# Patient Record
Sex: FEMALE | Race: WHITE
[De-identification: names, ages, dates, MRNs, and addresses within clinical notes are randomized per-mention and may not be internally consistent; named-entity substitution may affect disease eponyms.]

---

## 2017-02-27 ENCOUNTER — HOSPITAL ENCOUNTER (OUTPATIENT)
Dept: HOSPITAL 19 - MC.RAD | Age: 72
End: 2017-02-27
Attending: INTERNAL MEDICINE
Payer: COMMERCIAL

## 2017-02-27 DIAGNOSIS — Z12.31: Primary | ICD-10-CM

## 2017-10-24 ENCOUNTER — HOSPITAL ENCOUNTER (OUTPATIENT)
Dept: HOSPITAL 19 - EUO | Age: 72
Discharge: HOME | End: 2017-10-24
Attending: INTERNAL MEDICINE
Payer: COMMERCIAL

## 2017-10-24 VITALS — WEIGHT: 187.83 LBS | HEIGHT: 60 IN | BODY MASS INDEX: 36.88 KG/M2

## 2017-10-24 VITALS — HEART RATE: 61 BPM | DIASTOLIC BLOOD PRESSURE: 63 MMHG | TEMPERATURE: 97.7 F | SYSTOLIC BLOOD PRESSURE: 147 MMHG

## 2017-10-24 DIAGNOSIS — M81.0: Primary | ICD-10-CM

## 2018-02-28 ENCOUNTER — HOSPITAL ENCOUNTER (OUTPATIENT)
Dept: HOSPITAL 19 - MC.RAD | Age: 73
End: 2018-02-28
Attending: INTERNAL MEDICINE
Payer: COMMERCIAL

## 2018-02-28 DIAGNOSIS — Z12.31: Primary | ICD-10-CM

## 2018-09-12 ENCOUNTER — HOSPITAL ENCOUNTER (OUTPATIENT)
Dept: HOSPITAL 19 - EUO | Age: 73
Discharge: HOME | End: 2018-09-12
Attending: INTERNAL MEDICINE
Payer: COMMERCIAL

## 2018-09-12 VITALS — HEIGHT: 60 IN | BODY MASS INDEX: 36.83 KG/M2 | WEIGHT: 187.61 LBS

## 2018-09-12 VITALS — HEART RATE: 64 BPM | DIASTOLIC BLOOD PRESSURE: 52 MMHG | TEMPERATURE: 96.9 F | SYSTOLIC BLOOD PRESSURE: 150 MMHG

## 2018-09-12 DIAGNOSIS — M81.0: Primary | ICD-10-CM

## 2018-09-12 DIAGNOSIS — Z79.899: ICD-10-CM

## 2019-03-07 ENCOUNTER — HOSPITAL ENCOUNTER (OUTPATIENT)
Dept: HOSPITAL 19 - MC.RAD | Age: 74
End: 2019-03-07
Attending: INTERNAL MEDICINE
Payer: MEDICARE

## 2019-03-07 DIAGNOSIS — Z12.31: Primary | ICD-10-CM

## 2019-10-21 ENCOUNTER — HOSPITAL ENCOUNTER (EMERGENCY)
Dept: HOSPITAL 19 - COL.ER | Age: 74
Discharge: HOME | End: 2019-10-21
Payer: MEDICARE

## 2019-10-21 VITALS — SYSTOLIC BLOOD PRESSURE: 115 MMHG | DIASTOLIC BLOOD PRESSURE: 56 MMHG

## 2019-10-21 VITALS — HEART RATE: 80 BPM

## 2019-10-21 VITALS — TEMPERATURE: 97.7 F

## 2019-10-21 VITALS — BODY MASS INDEX: 33.76 KG/M2 | WEIGHT: 171.96 LBS | HEIGHT: 60 IN

## 2019-10-21 DIAGNOSIS — Z95.9: ICD-10-CM

## 2019-10-21 DIAGNOSIS — E78.00: ICD-10-CM

## 2019-10-21 DIAGNOSIS — Z79.01: ICD-10-CM

## 2019-10-21 DIAGNOSIS — Z95.1: ICD-10-CM

## 2019-10-21 DIAGNOSIS — Z79.84: ICD-10-CM

## 2019-10-21 DIAGNOSIS — I25.10: ICD-10-CM

## 2019-10-21 DIAGNOSIS — E11.9: ICD-10-CM

## 2019-10-21 DIAGNOSIS — N17.9: Primary | ICD-10-CM

## 2019-10-21 DIAGNOSIS — Z79.82: ICD-10-CM

## 2019-10-21 DIAGNOSIS — I10: ICD-10-CM

## 2019-10-21 LAB
ALBUMIN SERPL-MCNC: 4.1 GM/DL (ref 3.5–5)
ALP SERPL-CCNC: 79 U/L (ref 50–136)
ALT SERPL-CCNC: 12 U/L (ref 9–52)
ANION GAP SERPL CALC-SCNC: 13 MMOL/L (ref 7–16)
AST SERPL-CCNC: 29 U/L (ref 15–37)
BASOPHILS # BLD: 0.1 10*3/UL (ref 0–0.2)
BASOPHILS NFR BLD AUTO: 1 % (ref 0–2)
BILIRUB SERPL-MCNC: 0.5 MG/DL (ref 0–1)
BUN SERPL-MCNC: 36 MG/DL (ref 7–17)
CALCIUM SERPL-MCNC: 10 MG/DL (ref 8.4–10.2)
CHLORIDE SERPL-SCNC: 102 MMOL/L (ref 98–107)
CO2 SERPL-SCNC: 24 MMOL/L (ref 22–30)
CREAT SERPL-SCNC: 1.35 UMOL/L (ref 0.52–1.25)
CRP SERPL-MCNC: 0.5 MG/DL (ref 0–0.9)
EOSINOPHIL # BLD: 0.1 10*3/UL (ref 0–0.7)
EOSINOPHIL NFR BLD: 1.8 % (ref 0–4)
ERYTHROCYTE [DISTWIDTH] IN BLOOD BY AUTOMATED COUNT: 14 % (ref 11.5–14.5)
GLUCOSE SERPL-MCNC: 98 MG/DL (ref 74–106)
GLUCOSE UR QL STRIP.AUTO: (no result)
GRANULOCYTES # BLD AUTO: 53.9 % (ref 42.2–75.2)
HCT VFR BLD AUTO: 36.6 % (ref 37–47)
HGB BLD-MCNC: 11.9 G/DL (ref 12.5–16)
INR BLD: 2.8 (ref 0.8–3)
LYMPHOCYTES # BLD: 2.4 10*3/UL (ref 1.2–3.4)
LYMPHOCYTES NFR BLD: 32.4 % (ref 20–51)
MCH RBC QN AUTO: 30 PG (ref 27–31)
MCHC RBC AUTO-ENTMCNC: 33 G/DL (ref 33–37)
MCV RBC AUTO: 92 FL (ref 80–100)
MONOCYTES # BLD: 0.8 10*3/UL (ref 0.1–0.6)
MONOCYTES NFR BLD AUTO: 10.5 % (ref 1.7–9.3)
NEUTROPHILS # BLD: 4 10*3/UL (ref 1.4–6.5)
PH UR STRIP.AUTO: 5 [PH] (ref 5–8)
PLATELET # BLD AUTO: 285 K/MM3 (ref 130–400)
PMV BLD AUTO: 10 FL (ref 7.4–10.4)
POTASSIUM SERPL-SCNC: 3.8 MMOL/L (ref 3.4–5)
PROT SERPL-MCNC: 7.2 GM/DL (ref 6.4–8.2)
PROTHROMBIN TIME: 34 SECONDS (ref 9.7–12.8)
RBC # BLD AUTO: 3.96 M/MM3 (ref 4.1–5.3)
RBC # UR STRIP.AUTO: (no result) /UL
RBC # UR: (no result) /HPF
SODIUM SERPL-SCNC: 139 MMOL/L (ref 137–145)
SP GR UR STRIP.AUTO: 1.01 (ref 1–1.03)
SQUAMOUS # URNS: (no result) /HPF
TROPONIN I SERPL-MCNC: < 0.012 NG/ML (ref 0–0.04)
URINE LEUKOCYTE ESTERASE: (no result)
URN COLLECT METHOD CLASS: (no result)

## 2019-11-08 ENCOUNTER — HOSPITAL ENCOUNTER (OUTPATIENT)
Dept: HOSPITAL 19 - EUO | Age: 74
Discharge: HOME | End: 2019-11-08
Attending: INTERNAL MEDICINE
Payer: MEDICARE

## 2019-11-08 VITALS — DIASTOLIC BLOOD PRESSURE: 67 MMHG | HEART RATE: 75 BPM | SYSTOLIC BLOOD PRESSURE: 121 MMHG | TEMPERATURE: 98.1 F

## 2019-11-08 VITALS — HEIGHT: 60 IN | WEIGHT: 167.55 LBS | BODY MASS INDEX: 32.89 KG/M2

## 2019-11-08 DIAGNOSIS — M81.0: Primary | ICD-10-CM

## 2020-01-03 ENCOUNTER — HOSPITAL ENCOUNTER (OUTPATIENT)
Dept: HOSPITAL 19 - COL.CR | Age: 75
LOS: 4 days | Discharge: HOME | End: 2020-01-07
Attending: OBSTETRICS & GYNECOLOGY
Payer: MEDICARE

## 2020-01-03 DIAGNOSIS — Z48.812: Primary | ICD-10-CM

## 2020-01-03 DIAGNOSIS — Z95.1: ICD-10-CM

## 2020-01-22 ENCOUNTER — HOSPITAL ENCOUNTER (OUTPATIENT)
Dept: HOSPITAL 19 - COL.CR | Age: 75
LOS: 7 days | Discharge: HOME | End: 2020-01-29
Attending: OBSTETRICS & GYNECOLOGY
Payer: MEDICARE

## 2020-01-22 DIAGNOSIS — Z48.812: Primary | ICD-10-CM

## 2020-01-22 DIAGNOSIS — Z95.1: ICD-10-CM

## 2020-05-05 ENCOUNTER — HOSPITAL ENCOUNTER (OUTPATIENT)
Dept: HOSPITAL 19 - COL.CAR | Age: 75
Discharge: HOME | End: 2020-05-05
Attending: INTERNAL MEDICINE
Payer: MEDICARE

## 2020-05-05 VITALS — SYSTOLIC BLOOD PRESSURE: 158 MMHG | DIASTOLIC BLOOD PRESSURE: 73 MMHG | HEART RATE: 78 BPM | TEMPERATURE: 98.3 F

## 2020-05-05 DIAGNOSIS — I47.1: ICD-10-CM

## 2020-05-05 DIAGNOSIS — I48.0: Primary | ICD-10-CM

## 2020-09-08 ENCOUNTER — HOSPITAL ENCOUNTER (OUTPATIENT)
Dept: HOSPITAL 19 - SDCO | Age: 75
LOS: 1 days | Discharge: HOME | End: 2020-09-09
Attending: ORTHOPAEDIC SURGERY
Payer: MEDICARE

## 2020-09-08 VITALS — DIASTOLIC BLOOD PRESSURE: 49 MMHG | HEART RATE: 62 BPM | SYSTOLIC BLOOD PRESSURE: 113 MMHG | TEMPERATURE: 98 F

## 2020-09-08 VITALS — HEART RATE: 64 BPM | DIASTOLIC BLOOD PRESSURE: 49 MMHG | TEMPERATURE: 98 F | SYSTOLIC BLOOD PRESSURE: 113 MMHG

## 2020-09-08 VITALS — DIASTOLIC BLOOD PRESSURE: 58 MMHG | HEART RATE: 60 BPM | TEMPERATURE: 98 F | SYSTOLIC BLOOD PRESSURE: 116 MMHG

## 2020-09-08 VITALS — DIASTOLIC BLOOD PRESSURE: 65 MMHG | TEMPERATURE: 98 F | HEART RATE: 62 BPM | SYSTOLIC BLOOD PRESSURE: 132 MMHG

## 2020-09-08 VITALS — HEART RATE: 57 BPM | TEMPERATURE: 98 F | SYSTOLIC BLOOD PRESSURE: 123 MMHG | DIASTOLIC BLOOD PRESSURE: 77 MMHG

## 2020-09-08 VITALS — SYSTOLIC BLOOD PRESSURE: 153 MMHG | TEMPERATURE: 98.5 F | DIASTOLIC BLOOD PRESSURE: 59 MMHG | HEART RATE: 76 BPM

## 2020-09-08 VITALS — BODY MASS INDEX: 35.06 KG/M2 | WEIGHT: 178.57 LBS | HEIGHT: 60 IN

## 2020-09-08 VITALS — HEART RATE: 63 BPM | DIASTOLIC BLOOD PRESSURE: 40 MMHG | SYSTOLIC BLOOD PRESSURE: 118 MMHG | TEMPERATURE: 98 F

## 2020-09-08 VITALS — HEART RATE: 64 BPM | SYSTOLIC BLOOD PRESSURE: 118 MMHG | DIASTOLIC BLOOD PRESSURE: 40 MMHG | TEMPERATURE: 98 F

## 2020-09-08 VITALS — DIASTOLIC BLOOD PRESSURE: 54 MMHG | SYSTOLIC BLOOD PRESSURE: 118 MMHG | HEART RATE: 77 BPM | TEMPERATURE: 99.3 F

## 2020-09-08 VITALS — HEART RATE: 60 BPM | TEMPERATURE: 98 F | DIASTOLIC BLOOD PRESSURE: 47 MMHG | SYSTOLIC BLOOD PRESSURE: 123 MMHG

## 2020-09-08 VITALS — SYSTOLIC BLOOD PRESSURE: 123 MMHG | DIASTOLIC BLOOD PRESSURE: 67 MMHG | TEMPERATURE: 98 F | HEART RATE: 61 BPM

## 2020-09-08 VITALS — TEMPERATURE: 98 F | SYSTOLIC BLOOD PRESSURE: 123 MMHG | DIASTOLIC BLOOD PRESSURE: 67 MMHG | HEART RATE: 61 BPM

## 2020-09-08 DIAGNOSIS — G47.33: ICD-10-CM

## 2020-09-08 DIAGNOSIS — Z95.1: ICD-10-CM

## 2020-09-08 DIAGNOSIS — I11.9: ICD-10-CM

## 2020-09-08 DIAGNOSIS — Z79.82: ICD-10-CM

## 2020-09-08 DIAGNOSIS — Z79.84: ICD-10-CM

## 2020-09-08 DIAGNOSIS — Z98.51: ICD-10-CM

## 2020-09-08 DIAGNOSIS — Z79.01: ICD-10-CM

## 2020-09-08 DIAGNOSIS — I35.0: ICD-10-CM

## 2020-09-08 DIAGNOSIS — Z79.899: ICD-10-CM

## 2020-09-08 DIAGNOSIS — I47.1: ICD-10-CM

## 2020-09-08 DIAGNOSIS — I48.91: ICD-10-CM

## 2020-09-08 DIAGNOSIS — M17.12: Primary | ICD-10-CM

## 2020-09-08 DIAGNOSIS — E11.9: ICD-10-CM

## 2020-09-08 DIAGNOSIS — Z85.828: ICD-10-CM

## 2020-09-08 DIAGNOSIS — E78.5: ICD-10-CM

## 2020-09-08 DIAGNOSIS — Z20.828: ICD-10-CM

## 2020-09-08 DIAGNOSIS — Z95.818: ICD-10-CM

## 2020-09-08 LAB
INR BLD: 1 (ref 0.8–3)
PROTHROMBIN TIME: 10.9 SECONDS (ref 9.7–12.8)

## 2020-09-08 PROCEDURE — C1776 JOINT DEVICE (IMPLANTABLE): HCPCS

## 2020-09-08 PROCEDURE — A9284 NON-ELECTRONIC SPIROMETER: HCPCS

## 2020-09-08 NOTE — NUR
Did receive Norco 2 tabs about an hour ago-- now up in pena for short walk
with walker and assistance- tolerated well.

## 2020-09-08 NOTE — NUR
PT TO ROOM 328 PER  BED WITH REPORT FROM [facility] @ 0988.  LUNGS CTA,
BOWEL SOUNDS HYPO,  DRESSING TO LEFT KNEE CDI WITH ACE WRAP OVER GAUZE. SCDS
AND TEDS APPLIED AS ORDERED. IV TO PUMP.

## 2020-09-08 NOTE — NUR
PT UP TO BR WITH SBAX1. HAD LG BM AND VOIDED. RETURNED TO RECLINER. IV FLUIDS
COMPLETE. PT EATING AND DRINKING WITH NO N/V. Operative leg elevated on pillow
and ice bag applied.

## 2020-09-08 NOTE — NUR
SW met with the patient to discuss discharge plan. The patient lives in
[place] with her , Steven (ph#[phone]). She reports
independence with ADLs and has a cane and FWW. The patient's PCP is Dr. Tony Le and she receives her medications at [facility]. She reports no
difficulties obtaining her meds. The patient does not have advanced directives
and she was not interested in completing a DPOA-HC at this time. The patient
plans to return home with her  and receive outpatient PT at [facility] upon discharge. No additional needs at this time.

## 2020-09-08 NOTE — NUR
Initial shift assessment done- states would like 2 Norco before bed tonight-
states left knee pain 5/10-- ace wrap dry, ice to knee, SCD,s on bilaterally.
Taking food/fluids without nausea. Using IS  every hour while awake. Up to
bathroom with walker and assist-tolerated well, voiding clear yellow urine.

## 2020-09-09 VITALS — TEMPERATURE: 98.3 F | DIASTOLIC BLOOD PRESSURE: 56 MMHG | HEART RATE: 72 BPM | SYSTOLIC BLOOD PRESSURE: 131 MMHG

## 2020-09-09 VITALS — SYSTOLIC BLOOD PRESSURE: 166 MMHG | HEART RATE: 65 BPM | TEMPERATURE: 98.2 F | DIASTOLIC BLOOD PRESSURE: 54 MMHG

## 2020-09-09 VITALS — DIASTOLIC BLOOD PRESSURE: 65 MMHG | HEART RATE: 72 BPM | SYSTOLIC BLOOD PRESSURE: 150 MMHG | TEMPERATURE: 97.8 F

## 2020-09-09 VITALS — DIASTOLIC BLOOD PRESSURE: 59 MMHG | TEMPERATURE: 98.3 F | HEART RATE: 71 BPM | SYSTOLIC BLOOD PRESSURE: 131 MMHG

## 2020-09-09 LAB
HCT VFR BLD AUTO: 37.2 % (ref 37–47)
HGB BLD-MCNC: 12.2 G/DL (ref 12.5–16)

## 2020-09-09 NOTE — NUR
DISCHARGE INSTRUCTIONS GIVEN TO PT. QUESTIONS ANSWERED. PRESCRIPTIONS GIVEN.
PT TO FRONT IN WHEEL CHAIR.

## 2020-09-09 NOTE — NUR
Sleeping well tonight, has not requested pain meds since before bed- new ice
to left knee , VSS, has been up to void 3-4 times last night

## 2021-03-22 ENCOUNTER — HOSPITAL ENCOUNTER (OUTPATIENT)
Dept: HOSPITAL 19 - COL.RAD | Age: 76
End: 2021-03-22
Attending: INTERNAL MEDICINE
Payer: MEDICARE

## 2021-03-22 DIAGNOSIS — Z20.822: Primary | ICD-10-CM

## 2021-04-08 ENCOUNTER — HOSPITAL ENCOUNTER (OUTPATIENT)
Dept: HOSPITAL 19 - COL.CAR | Age: 76
Discharge: HOME | End: 2021-04-08
Attending: INTERNAL MEDICINE
Payer: MEDICARE

## 2021-04-08 VITALS — BODY MASS INDEX: 33.85 KG/M2 | WEIGHT: 172.4 LBS | HEIGHT: 60 IN

## 2021-04-08 VITALS — TEMPERATURE: 98.7 F | DIASTOLIC BLOOD PRESSURE: 65 MMHG | SYSTOLIC BLOOD PRESSURE: 133 MMHG | HEART RATE: 49 BPM

## 2021-04-08 DIAGNOSIS — M19.90: ICD-10-CM

## 2021-04-08 DIAGNOSIS — I25.10: ICD-10-CM

## 2021-04-08 DIAGNOSIS — K21.9: ICD-10-CM

## 2021-04-08 DIAGNOSIS — Z53.8: ICD-10-CM

## 2021-04-08 DIAGNOSIS — Z96.652: ICD-10-CM

## 2021-04-08 DIAGNOSIS — E78.5: ICD-10-CM

## 2021-04-08 DIAGNOSIS — Z88.8: ICD-10-CM

## 2021-04-08 DIAGNOSIS — Z79.01: ICD-10-CM

## 2021-04-08 DIAGNOSIS — E11.9: ICD-10-CM

## 2021-04-08 DIAGNOSIS — Z79.82: ICD-10-CM

## 2021-04-08 DIAGNOSIS — I48.0: Primary | ICD-10-CM

## 2021-04-08 DIAGNOSIS — G47.33: ICD-10-CM

## 2021-04-08 DIAGNOSIS — I10: ICD-10-CM

## 2021-04-08 NOTE — NUR
Dr. Grigsby has been in to see pt and discuss poc.  Cardioversion cancelled. Pt
is noted to have a rhythm change at time of departure, rate 80's-90's,
irregular.  Dr. Grigsby informed through [facility].  Due to paroxysmal nature of
the AFIB, plan remains to dc patient with instructions to take her amiodarone
200 mg BID and follow up with cardiology.  Pt verbalized understanding of
instructions.  No iv was initiated during pt's visit.  I walked with pt to
exit with her .

## 2022-04-07 ENCOUNTER — HOSPITAL ENCOUNTER (INPATIENT)
Dept: HOSPITAL 19 - MEDICAL | Age: 77
LOS: 8 days | Discharge: HOME | DRG: 310 | End: 2022-04-15
Attending: INTERNAL MEDICINE | Admitting: INTERNAL MEDICINE
Payer: MEDICARE

## 2022-04-07 VITALS — WEIGHT: 177.25 LBS | BODY MASS INDEX: 34.8 KG/M2 | HEIGHT: 60 IN

## 2022-04-07 DIAGNOSIS — Z23: ICD-10-CM

## 2022-04-07 DIAGNOSIS — Z95.1: ICD-10-CM

## 2022-04-07 DIAGNOSIS — I10: ICD-10-CM

## 2022-04-07 DIAGNOSIS — I48.0: Primary | ICD-10-CM

## 2022-04-07 DIAGNOSIS — I25.10: ICD-10-CM

## 2022-04-07 DIAGNOSIS — I44.7: ICD-10-CM

## 2022-04-07 DIAGNOSIS — E78.5: ICD-10-CM

## 2022-04-07 DIAGNOSIS — Z79.01: ICD-10-CM

## 2022-04-07 PROCEDURE — A9270 NON-COVERED ITEM OR SERVICE: HCPCS

## 2022-04-13 VITALS — TEMPERATURE: 98.1 F | SYSTOLIC BLOOD PRESSURE: 96 MMHG | HEART RATE: 143 BPM | DIASTOLIC BLOOD PRESSURE: 84 MMHG

## 2022-04-13 VITALS — DIASTOLIC BLOOD PRESSURE: 93 MMHG | TEMPERATURE: 98.1 F | HEART RATE: 80 BPM | SYSTOLIC BLOOD PRESSURE: 110 MMHG

## 2022-04-13 VITALS — HEART RATE: 117 BPM | DIASTOLIC BLOOD PRESSURE: 75 MMHG | SYSTOLIC BLOOD PRESSURE: 114 MMHG | TEMPERATURE: 98 F

## 2022-04-13 VITALS — DIASTOLIC BLOOD PRESSURE: 71 MMHG | SYSTOLIC BLOOD PRESSURE: 107 MMHG | HEART RATE: 68 BPM | TEMPERATURE: 98.5 F

## 2022-04-13 LAB
ALBUMIN SERPL-MCNC: 4 GM/DL (ref 3.4–4.8)
ALP SERPL-CCNC: 45 U/L (ref 40–150)
ALT SERPL-CCNC: 22 U/L (ref 0–55)
ANION GAP SERPL CALC-SCNC: 14 MMOL/L (ref 7–16)
AST SERPL-CCNC: 16 U/L (ref 5–34)
BASOPHILS # BLD: 0 K/MM3 (ref 0–0.2)
BASOPHILS NFR BLD AUTO: 0.8 % (ref 0–2)
BILIRUB SERPL-MCNC: 0.8 MG/DL (ref 0.2–1.2)
BUN SERPL-MCNC: 29 MG/DL (ref 10–20)
CALCIUM SERPL-MCNC: 9.8 MG/DL (ref 8.4–10.2)
CHLORIDE SERPL-SCNC: 108 MMOL/L (ref 98–107)
CO2 SERPL-SCNC: 22 MMOL/L (ref 23–31)
CREAT SERPL-SCNC: 1.1 MG/DL (ref 0.57–1.11)
EOSINOPHIL # BLD: 0.1 K/MM3 (ref 0–0.7)
EOSINOPHIL NFR BLD: 1.3 % (ref 0–4)
ERYTHROCYTE [DISTWIDTH] IN BLOOD BY AUTOMATED COUNT: 14.2 % (ref 11.5–14.5)
GLUCOSE SERPL-MCNC: 108 MG/DL (ref 70–99)
GRANULOCYTES # BLD AUTO: 56.9 % (ref 42.2–75.2)
HCT VFR BLD AUTO: 41.6 % (ref 37–47)
HGB BLD-MCNC: 14 G/DL (ref 12.5–16)
INR BLD: 2.8 (ref 0.8–3)
LYMPHOCYTES # BLD: 1.3 K/MM3 (ref 1.2–3.4)
LYMPHOCYTES NFR BLD: 26.3 % (ref 20–51)
MAGNESIUM SERPL-MCNC: 1.5 MG/DL (ref 1.6–2.6)
MCH RBC QN AUTO: 30 PG (ref 27–31)
MCHC RBC AUTO-ENTMCNC: 34 G/DL (ref 33–37)
MCV RBC AUTO: 89 FL (ref 80–100)
MONOCYTES # BLD: 0.7 K/MM3 (ref 0.1–0.6)
MONOCYTES NFR BLD AUTO: 14.5 % (ref 1.7–9.3)
NEUTROPHILS # BLD: 2.7 K/MM3 (ref 1.4–6.5)
PLATELET # BLD AUTO: 218 K/MM3 (ref 130–400)
PMV BLD AUTO: 9.8 FL (ref 7.4–10.4)
POTASSIUM SERPL-SCNC: 3.9 MMOL/L (ref 3.5–4.5)
PROT SERPL-MCNC: 6.8 GM/DL (ref 6.2–8.1)
PROTHROMBIN TIME: 30.9 SECONDS (ref 9.7–12.8)
RBC # BLD AUTO: 4.7 M/MM3 (ref 4.1–5.3)
SODIUM SERPL-SCNC: 144 MMOL/L (ref 136–145)

## 2022-04-13 NOTE — NUR
PATIENT IS A&O. NOTED ELEVATED HR IN 'S ON TELE, IRREGULAR. PATIENT IS
SOTOLOL DAY 1. ALL OTHER VSS. DENIES CHEST PAIN, SOA OR DIZZINESS. INDEPENDENT
IN ROOM. NO C/O N/V. LEFT FORARM IV TO INT. HS MEDS GIVEN. HEAD TO TOE
ASSESSMENT COMPLETE. NO OTHER NEEDS AT THIS TIME. CALL LIGHT IN REACH.

## 2022-04-13 NOTE — NUR
Pt tolerated first dose of Sotalol without issues. No chest pain, dizziness or
palpitations. IV started in Express to LFA. POC discussed with patient. No
needs at this time.

## 2022-04-14 VITALS — DIASTOLIC BLOOD PRESSURE: 56 MMHG | HEART RATE: 51 BPM | TEMPERATURE: 98 F | SYSTOLIC BLOOD PRESSURE: 106 MMHG

## 2022-04-14 VITALS — HEART RATE: 87 BPM | SYSTOLIC BLOOD PRESSURE: 115 MMHG | DIASTOLIC BLOOD PRESSURE: 88 MMHG | TEMPERATURE: 97.9 F

## 2022-04-14 VITALS — SYSTOLIC BLOOD PRESSURE: 97 MMHG | HEART RATE: 57 BPM | TEMPERATURE: 98 F | DIASTOLIC BLOOD PRESSURE: 54 MMHG

## 2022-04-14 VITALS — TEMPERATURE: 97.7 F | SYSTOLIC BLOOD PRESSURE: 117 MMHG | DIASTOLIC BLOOD PRESSURE: 64 MMHG | HEART RATE: 88 BPM

## 2022-04-14 VITALS — DIASTOLIC BLOOD PRESSURE: 54 MMHG | TEMPERATURE: 98 F | HEART RATE: 64 BPM | SYSTOLIC BLOOD PRESSURE: 140 MMHG

## 2022-04-14 VITALS — DIASTOLIC BLOOD PRESSURE: 60 MMHG | SYSTOLIC BLOOD PRESSURE: 153 MMHG | TEMPERATURE: 97.6 F | HEART RATE: 62 BPM

## 2022-04-14 VITALS — TEMPERATURE: 98.3 F | DIASTOLIC BLOOD PRESSURE: 56 MMHG | HEART RATE: 59 BPM | SYSTOLIC BLOOD PRESSURE: 131 MMHG

## 2022-04-14 LAB
ANION GAP SERPL CALC-SCNC: 12 MMOL/L (ref 7–16)
BASOPHILS # BLD: 0.1 K/MM3 (ref 0–0.2)
BASOPHILS NFR BLD AUTO: 1.2 % (ref 0–2)
BUN SERPL-MCNC: 27 MG/DL (ref 10–20)
CALCIUM SERPL-MCNC: 9.1 MG/DL (ref 8.4–10.2)
CHLORIDE SERPL-SCNC: 106 MMOL/L (ref 98–107)
CO2 SERPL-SCNC: 22 MMOL/L (ref 23–31)
CREAT SERPL-SCNC: 0.9 MG/DL (ref 0.57–1.11)
EOSINOPHIL # BLD: 0.1 K/MM3 (ref 0–0.7)
EOSINOPHIL NFR BLD: 2.6 % (ref 0–4)
ERYTHROCYTE [DISTWIDTH] IN BLOOD BY AUTOMATED COUNT: 13.8 % (ref 11.5–14.5)
GLUCOSE SERPL-MCNC: 100 MG/DL (ref 70–99)
GRANULOCYTES # BLD AUTO: 46.7 % (ref 42.2–75.2)
HCT VFR BLD AUTO: 40.8 % (ref 37–47)
HGB BLD-MCNC: 13.4 G/DL (ref 12.5–16)
INR BLD: 2.8 (ref 0.8–3)
LYMPHOCYTES # BLD: 1.6 K/MM3 (ref 1.2–3.4)
LYMPHOCYTES NFR BLD: 37.7 % (ref 20–51)
MAGNESIUM SERPL-MCNC: 1.9 MG/DL (ref 1.6–2.6)
MCH RBC QN AUTO: 30 PG (ref 27–31)
MCHC RBC AUTO-ENTMCNC: 33 G/DL (ref 33–37)
MCV RBC AUTO: 91 FL (ref 80–100)
MONOCYTES # BLD: 0.5 K/MM3 (ref 0.1–0.6)
MONOCYTES NFR BLD AUTO: 11.6 % (ref 1.7–9.3)
NEUTROPHILS # BLD: 2 K/MM3 (ref 1.4–6.5)
PLATELET # BLD AUTO: 209 K/MM3 (ref 130–400)
PMV BLD AUTO: 9.7 FL (ref 7.4–10.4)
POTASSIUM SERPL-SCNC: 3.7 MMOL/L (ref 3.5–4.5)
PROTHROMBIN TIME: 31.1 SECONDS (ref 9.7–12.8)
RBC # BLD AUTO: 4.5 M/MM3 (ref 4.1–5.3)
SODIUM SERPL-SCNC: 140 MMOL/L (ref 136–145)

## 2022-04-14 NOTE — NUR
Scheduled medications given. Shift assessment performed. Patient denies any
pain, discomfort, or further needs at this time. SOA upon exertion noted.
Patient scheduled for cardioversion tmrw AM. NPO at midnight, consent signed
and on the chart. VSS. Patient A&O.

## 2022-04-14 NOTE — NUR
Telemetry had called and stated that it looked like patient had converted to
NS. Ordered EKG and called RT: sinus rhythm with occasional supraventricular
premature complexes.

## 2022-04-14 NOTE — NUR
Patient assessed around 2035. Alert and oriented x 4, and able to make needs
known. Denies having pain and discomfort. Peripheral INT to left forearm.
Reports SOB and dyspnea with exertion, denies at rest. LS CTA. HRI. Telemetry
in place. BSAx4. Voices no questions, needs, or concerns at this time. Aware
that she is scheduled to have cardioversion tomorrow morning, and that she is
NPO after midnight. Consent is signed and on the chart. In bed with call light
within reach.

## 2022-04-14 NOTE — NUR
met with patient to discuss discharge plan. Patient currently
lives at home with her  Steven ([phone]) in [place]. She
reports to being fully independent with her ADL's and does not utilize any DME
equipment to assist with mobility. She has no home oxygen needs. PCP is Dr. Le and she utilizes [facility] for medications with no cost
difficulty. Patient reports that she does not have a DPOA-HC established and
is not interested in creating one at this time. Education provided and the
patient verbalizes her understanding. In addition to her  she has 5
childre, all daughters; Marilou, Monique, Simran,Christina and Angie. Patient is
planning on returning home once medically ready.
 
Discharge plan: Home

## 2022-04-15 VITALS — DIASTOLIC BLOOD PRESSURE: 44 MMHG | TEMPERATURE: 98 F | HEART RATE: 51 BPM | SYSTOLIC BLOOD PRESSURE: 99 MMHG

## 2022-04-15 VITALS — DIASTOLIC BLOOD PRESSURE: 62 MMHG | HEART RATE: 54 BPM | SYSTOLIC BLOOD PRESSURE: 138 MMHG | TEMPERATURE: 98 F

## 2022-04-15 VITALS — SYSTOLIC BLOOD PRESSURE: 129 MMHG | HEART RATE: 55 BPM | TEMPERATURE: 97.9 F | DIASTOLIC BLOOD PRESSURE: 62 MMHG

## 2022-04-15 LAB
ANION GAP SERPL CALC-SCNC: 11 MMOL/L (ref 7–16)
BASOPHILS # BLD: 0 K/MM3 (ref 0–0.2)
BASOPHILS NFR BLD AUTO: 0.9 % (ref 0–2)
BUN SERPL-MCNC: 29 MG/DL (ref 10–20)
CALCIUM SERPL-MCNC: 9.2 MG/DL (ref 8.4–10.2)
CHLORIDE SERPL-SCNC: 104 MMOL/L (ref 98–107)
CO2 SERPL-SCNC: 24 MMOL/L (ref 23–31)
CREAT SERPL-SCNC: 0.94 MG/DL (ref 0.57–1.11)
EOSINOPHIL # BLD: 0.1 K/MM3 (ref 0–0.7)
EOSINOPHIL NFR BLD: 2.2 % (ref 0–4)
ERYTHROCYTE [DISTWIDTH] IN BLOOD BY AUTOMATED COUNT: 13.7 % (ref 11.5–14.5)
GLUCOSE SERPL-MCNC: 94 MG/DL (ref 70–99)
GRANULOCYTES # BLD AUTO: 55.1 % (ref 42.2–75.2)
HCT VFR BLD AUTO: 39.3 % (ref 37–47)
HGB BLD-MCNC: 13.2 G/DL (ref 12.5–16)
INR BLD: 3.5 (ref 0.8–3)
LYMPHOCYTES # BLD: 1.4 K/MM3 (ref 1.2–3.4)
LYMPHOCYTES NFR BLD: 29.7 % (ref 20–51)
MAGNESIUM SERPL-MCNC: 1.6 MG/DL (ref 1.6–2.6)
MCH RBC QN AUTO: 30 PG (ref 27–31)
MCHC RBC AUTO-ENTMCNC: 34 G/DL (ref 33–37)
MCV RBC AUTO: 89 FL (ref 80–100)
MONOCYTES # BLD: 0.6 K/MM3 (ref 0.1–0.6)
MONOCYTES NFR BLD AUTO: 11.9 % (ref 1.7–9.3)
NEUTROPHILS # BLD: 2.6 K/MM3 (ref 1.4–6.5)
PLATELET # BLD AUTO: 207 K/MM3 (ref 130–400)
PMV BLD AUTO: 10.2 FL (ref 7.4–10.4)
POTASSIUM SERPL-SCNC: 3.7 MMOL/L (ref 3.5–4.5)
PROTHROMBIN TIME: 39.8 SECONDS (ref 9.7–12.8)
RBC # BLD AUTO: 4.4 M/MM3 (ref 4.1–5.3)
SODIUM SERPL-SCNC: 139 MMOL/L (ref 136–145)

## 2022-04-15 NOTE — NUR
Scheduled medications given. Shift assessment performed. VSS. Patient A&O.
Patient has converted to SB. Cardioversion cancelled. Patient denies any pain,
discomfort, SOA, or futher needs at this time. Call light in reach. Tele in
place.

## 2022-04-15 NOTE — NUR
Patient deemed fit for discharge. IV DC'd, catheter intact, no signs of
phlebitis. Discharge education/instructions given. All questions answered.
VSS. Patient A&O. Patient denies any pain, discomfort, SOA, or further needs
at this time. Patient escorted from building via wheelchair by [facility]
Staff.  transporting home.

## 2023-03-27 ENCOUNTER — HOSPITAL ENCOUNTER (OUTPATIENT)
Dept: HOSPITAL 19 - COL.ER | Age: 78
Setting detail: OBSERVATION
LOS: 2 days | Discharge: HOME | End: 2023-03-29
Attending: INTERNAL MEDICINE | Admitting: INTERNAL MEDICINE
Payer: MEDICARE

## 2023-03-27 VITALS — DIASTOLIC BLOOD PRESSURE: 76 MMHG | SYSTOLIC BLOOD PRESSURE: 119 MMHG | HEART RATE: 102 BPM | TEMPERATURE: 97.5 F

## 2023-03-27 VITALS — DIASTOLIC BLOOD PRESSURE: 55 MMHG | SYSTOLIC BLOOD PRESSURE: 102 MMHG | HEART RATE: 85 BPM | TEMPERATURE: 97.9 F

## 2023-03-27 VITALS — TEMPERATURE: 97.4 F | DIASTOLIC BLOOD PRESSURE: 80 MMHG | SYSTOLIC BLOOD PRESSURE: 119 MMHG | HEART RATE: 114 BPM

## 2023-03-27 VITALS — BODY MASS INDEX: 32.85 KG/M2 | WEIGHT: 167.33 LBS | HEIGHT: 60 IN

## 2023-03-27 DIAGNOSIS — E11.9: ICD-10-CM

## 2023-03-27 DIAGNOSIS — Z79.01: ICD-10-CM

## 2023-03-27 DIAGNOSIS — Z95.818: ICD-10-CM

## 2023-03-27 DIAGNOSIS — I25.10: ICD-10-CM

## 2023-03-27 DIAGNOSIS — I48.91: Primary | ICD-10-CM

## 2023-03-27 DIAGNOSIS — Z79.84: ICD-10-CM

## 2023-03-27 DIAGNOSIS — Z79.899: ICD-10-CM

## 2023-03-27 DIAGNOSIS — E78.5: ICD-10-CM

## 2023-03-27 DIAGNOSIS — I10: ICD-10-CM

## 2023-03-27 DIAGNOSIS — Z95.1: ICD-10-CM

## 2023-03-27 LAB
ALBUMIN SERPL-MCNC: 3.7 GM/DL (ref 3.4–4.8)
ALP SERPL-CCNC: 45 U/L (ref 40–150)
ALT SERPL-CCNC: 20 U/L (ref 0–55)
ANION GAP SERPL CALC-SCNC: 13 MMOL/L (ref 7–16)
AST SERPL-CCNC: 18 U/L (ref 5–34)
BASOPHILS # BLD: 0.1 K/MM3 (ref 0–0.2)
BASOPHILS NFR BLD AUTO: 1.1 % (ref 0–2)
BILIRUB SERPL-MCNC: 0.5 MG/DL (ref 0.2–1.2)
BUN SERPL-MCNC: 25 MG/DL (ref 10–20)
CALCIUM SERPL-MCNC: 9.6 MG/DL (ref 8.4–10.2)
CHLORIDE SERPL-SCNC: 106 MMOL/L (ref 98–107)
CO2 SERPL-SCNC: 23 MMOL/L (ref 23–31)
CREAT SERPL-SCNC: 0.88 MG/DL (ref 0.57–1.11)
EOSINOPHIL # BLD: 0.1 K/MM3 (ref 0–0.7)
EOSINOPHIL NFR BLD: 1.3 % (ref 0–4)
ERYTHROCYTE [DISTWIDTH] IN BLOOD BY AUTOMATED COUNT: 13.3 % (ref 11.5–14.5)
GLUCOSE SERPL-MCNC: 105 MG/DL (ref 70–99)
GRANULOCYTES # BLD AUTO: 54.1 % (ref 42.2–75.2)
HCT VFR BLD AUTO: 39.8 % (ref 37–47)
HGB BLD-MCNC: 13.3 G/DL (ref 12.5–16)
INR BLD: 2.8 (ref 0.8–3)
LYMPHOCYTES # BLD: 1.5 K/MM3 (ref 1.2–3.4)
LYMPHOCYTES NFR BLD: 31.7 % (ref 20–51)
MCH RBC QN AUTO: 30 PG (ref 27–31)
MCHC RBC AUTO-ENTMCNC: 33 G/DL (ref 33–37)
MCV RBC AUTO: 90 FL (ref 80–100)
MONOCYTES # BLD: 0.5 K/MM3 (ref 0.1–0.6)
MONOCYTES NFR BLD AUTO: 11.6 % (ref 1.7–9.3)
NEUTROPHILS # BLD: 2.5 K/MM3 (ref 1.4–6.5)
PLATELET # BLD AUTO: 274 K/MM3 (ref 130–400)
PMV BLD AUTO: 9.8 FL (ref 7.4–10.4)
POTASSIUM SERPL-SCNC: 3.8 MMOL/L (ref 3.5–4.5)
PROT SERPL-MCNC: 6.8 GM/DL (ref 6.2–8.1)
PROTHROMBIN TIME: 32.8 SECONDS (ref 9.7–12.8)
RBC # BLD AUTO: 4.44 M/MM3 (ref 4.1–5.3)
SODIUM SERPL-SCNC: 142 MMOL/L (ref 136–145)
TROPONIN I SERPL-MCNC: < 0.01 NG/ML (ref 0–0.03)

## 2023-03-27 PROCEDURE — G0378 HOSPITAL OBSERVATION PER HR: HCPCS

## 2023-03-27 PROCEDURE — A9270 NON-COVERED ITEM OR SERVICE: HCPCS

## 2023-03-27 NOTE — NUR
PATIENT AWAKE AND ALERT, RESTING IN BED. NO NEEDS OR COMPLAINTS. VITAL SIGNS
STABLE. ADDMISSION INTAKE AND ASSESSMENT COMPLETE. PATIENT AWARE SHE IS NPO
UNTIL FURTHER NOTICE FROM MD OR RN.

## 2023-03-27 NOTE — NUR
PATIENT AWAKE AND ALERT, RESTING IN BED, NO NEEDS OR COMPLAINTS AT THIS TIME.
CARDIOVERSION CONSENT ON CHART. CALL LIGHT WITHIN REACH.

## 2023-03-28 VITALS — HEART RATE: 56 BPM | DIASTOLIC BLOOD PRESSURE: 65 MMHG | SYSTOLIC BLOOD PRESSURE: 151 MMHG

## 2023-03-28 VITALS — DIASTOLIC BLOOD PRESSURE: 62 MMHG | SYSTOLIC BLOOD PRESSURE: 147 MMHG | HEART RATE: 63 BPM | TEMPERATURE: 97.7 F

## 2023-03-28 VITALS — DIASTOLIC BLOOD PRESSURE: 55 MMHG | SYSTOLIC BLOOD PRESSURE: 108 MMHG | HEART RATE: 52 BPM | TEMPERATURE: 97.4 F

## 2023-03-28 VITALS — DIASTOLIC BLOOD PRESSURE: 80 MMHG | TEMPERATURE: 97.3 F | HEART RATE: 76 BPM | SYSTOLIC BLOOD PRESSURE: 116 MMHG

## 2023-03-28 VITALS — HEART RATE: 57 BPM | DIASTOLIC BLOOD PRESSURE: 48 MMHG | SYSTOLIC BLOOD PRESSURE: 131 MMHG

## 2023-03-28 VITALS — HEART RATE: 60 BPM | DIASTOLIC BLOOD PRESSURE: 65 MMHG | SYSTOLIC BLOOD PRESSURE: 127 MMHG

## 2023-03-28 VITALS — HEART RATE: 55 BPM | DIASTOLIC BLOOD PRESSURE: 66 MMHG | SYSTOLIC BLOOD PRESSURE: 120 MMHG

## 2023-03-28 VITALS — DIASTOLIC BLOOD PRESSURE: 72 MMHG | HEART RATE: 87 BPM | SYSTOLIC BLOOD PRESSURE: 131 MMHG | TEMPERATURE: 97.6 F

## 2023-03-28 VITALS — DIASTOLIC BLOOD PRESSURE: 77 MMHG | SYSTOLIC BLOOD PRESSURE: 149 MMHG | HEART RATE: 63 BPM

## 2023-03-28 VITALS — TEMPERATURE: 97.4 F | HEART RATE: 20 BPM | SYSTOLIC BLOOD PRESSURE: 149 MMHG | DIASTOLIC BLOOD PRESSURE: 90 MMHG

## 2023-03-28 VITALS — TEMPERATURE: 97.6 F | SYSTOLIC BLOOD PRESSURE: 117 MMHG | HEART RATE: 85 BPM | DIASTOLIC BLOOD PRESSURE: 64 MMHG

## 2023-03-28 VITALS — HEART RATE: 58 BPM | DIASTOLIC BLOOD PRESSURE: 56 MMHG | SYSTOLIC BLOOD PRESSURE: 125 MMHG | TEMPERATURE: 97.6 F

## 2023-03-28 VITALS — DIASTOLIC BLOOD PRESSURE: 30 MMHG | SYSTOLIC BLOOD PRESSURE: 98 MMHG | HEART RATE: 50 BPM | TEMPERATURE: 98 F

## 2023-03-28 VITALS — SYSTOLIC BLOOD PRESSURE: 103 MMHG | HEART RATE: 62 BPM | DIASTOLIC BLOOD PRESSURE: 51 MMHG

## 2023-03-28 LAB
ANION GAP SERPL CALC-SCNC: 11 MMOL/L (ref 7–16)
BASOPHILS # BLD: 0 K/MM3 (ref 0–0.2)
BASOPHILS NFR BLD AUTO: 0.9 % (ref 0–2)
BUN SERPL-MCNC: 21 MG/DL (ref 10–20)
CALCIUM SERPL-MCNC: 9.1 MG/DL (ref 8.4–10.2)
CHLORIDE SERPL-SCNC: 108 MMOL/L (ref 98–107)
CO2 SERPL-SCNC: 22 MMOL/L (ref 23–31)
CREAT SERPL-SCNC: 0.77 MG/DL (ref 0.57–1.11)
EOSINOPHIL # BLD: 0.1 K/MM3 (ref 0–0.7)
EOSINOPHIL NFR BLD: 1.9 % (ref 0–4)
ERYTHROCYTE [DISTWIDTH] IN BLOOD BY AUTOMATED COUNT: 13.2 % (ref 11.5–14.5)
GLUCOSE SERPL-MCNC: 105 MG/DL (ref 70–99)
GRANULOCYTES # BLD AUTO: 48.2 % (ref 42.2–75.2)
HCT VFR BLD AUTO: 38.3 % (ref 37–47)
HGB BLD-MCNC: 13.3 G/DL (ref 12.5–16)
INR BLD: 2.6 (ref 0.8–3)
LYMPHOCYTES # BLD: 1.6 K/MM3 (ref 1.2–3.4)
LYMPHOCYTES NFR BLD: 37.1 % (ref 20–51)
MCH RBC QN AUTO: 31 PG (ref 27–31)
MCHC RBC AUTO-ENTMCNC: 35 G/DL (ref 33–37)
MCV RBC AUTO: 88 FL (ref 80–100)
MONOCYTES # BLD: 0.5 K/MM3 (ref 0.1–0.6)
MONOCYTES NFR BLD AUTO: 11.7 % (ref 1.7–9.3)
NEUTROPHILS # BLD: 2.1 K/MM3 (ref 1.4–6.5)
PLATELET # BLD AUTO: 249 K/MM3 (ref 130–400)
PMV BLD AUTO: 9.6 FL (ref 7.4–10.4)
POTASSIUM SERPL-SCNC: 3.7 MMOL/L (ref 3.5–4.5)
PROTHROMBIN TIME: 30.3 SECONDS (ref 9.7–12.8)
RBC # BLD AUTO: 4.36 M/MM3 (ref 4.1–5.3)
SODIUM SERPL-SCNC: 141 MMOL/L (ref 136–145)

## 2023-03-28 NOTE — NUR
Initial visit:  Pt was resting and content.   stopped by room on
rounds.  Pt has no needs right now.   will follow up as needed.

## 2023-03-28 NOTE — NUR
DOMINGO met with the patient to discuss discharge plan. The patient lives in
[place] with her , Steven (ph#[phone]). She reports
independence with ADLs and does not have any DME. The patient's PCP is Dr. Tony Le and she receives her medications from [facility]. The patient does
not have a DPOA-HC, but she was interested in obtaining a form. DOMINGO provided.
The patient plans to return home with her  upon discharge. No
additional needs at this time.
 
*Discharge plan: home with *

## 2023-03-29 VITALS — SYSTOLIC BLOOD PRESSURE: 147 MMHG | DIASTOLIC BLOOD PRESSURE: 59 MMHG | HEART RATE: 58 BPM | TEMPERATURE: 98.2 F

## 2023-03-29 VITALS — TEMPERATURE: 98.1 F | SYSTOLIC BLOOD PRESSURE: 163 MMHG | DIASTOLIC BLOOD PRESSURE: 83 MMHG | HEART RATE: 54 BPM

## 2023-03-29 VITALS — HEART RATE: 62 BPM | SYSTOLIC BLOOD PRESSURE: 133 MMHG | TEMPERATURE: 98 F | DIASTOLIC BLOOD PRESSURE: 69 MMHG

## 2023-03-29 VITALS — TEMPERATURE: 98.1 F | HEART RATE: 53 BPM | SYSTOLIC BLOOD PRESSURE: 137 MMHG | DIASTOLIC BLOOD PRESSURE: 67 MMHG

## 2023-03-29 LAB
ANION GAP SERPL CALC-SCNC: 10 MMOL/L (ref 7–16)
BASOPHILS # BLD: 0.1 K/MM3 (ref 0–0.2)
BASOPHILS NFR BLD AUTO: 1.2 % (ref 0–2)
BUN SERPL-MCNC: 32 MG/DL (ref 10–20)
CALCIUM SERPL-MCNC: 9.4 MG/DL (ref 8.4–10.2)
CHLORIDE SERPL-SCNC: 109 MMOL/L (ref 98–107)
CO2 SERPL-SCNC: 22 MMOL/L (ref 23–31)
CREAT SERPL-SCNC: 0.87 MG/DL (ref 0.57–1.11)
EOSINOPHIL # BLD: 0.1 K/MM3 (ref 0–0.7)
EOSINOPHIL NFR BLD: 1.6 % (ref 0–4)
ERYTHROCYTE [DISTWIDTH] IN BLOOD BY AUTOMATED COUNT: 13.2 % (ref 11.5–14.5)
GLUCOSE SERPL-MCNC: 101 MG/DL (ref 70–99)
GRANULOCYTES # BLD AUTO: 49 % (ref 42.2–75.2)
HCT VFR BLD AUTO: 38.1 % (ref 37–47)
HGB BLD-MCNC: 12.4 G/DL (ref 12.5–16)
INR BLD: 2.7 (ref 0.8–3)
LYMPHOCYTES # BLD: 1.5 K/MM3 (ref 1.2–3.4)
LYMPHOCYTES NFR BLD: 34.5 % (ref 20–51)
MAGNESIUM SERPL-MCNC: 1.7 MG/DL (ref 1.6–2.6)
MCH RBC QN AUTO: 30 PG (ref 27–31)
MCHC RBC AUTO-ENTMCNC: 33 G/DL (ref 33–37)
MCV RBC AUTO: 91 FL (ref 80–100)
MONOCYTES # BLD: 0.6 K/MM3 (ref 0.1–0.6)
MONOCYTES NFR BLD AUTO: 13.5 % (ref 1.7–9.3)
NEUTROPHILS # BLD: 2.1 K/MM3 (ref 1.4–6.5)
PLATELET # BLD AUTO: 265 K/MM3 (ref 130–400)
PMV BLD AUTO: 9.8 FL (ref 7.4–10.4)
POTASSIUM SERPL-SCNC: 3.6 MMOL/L (ref 3.5–4.5)
PROTHROMBIN TIME: 31.1 SECONDS (ref 9.7–12.8)
RBC # BLD AUTO: 4.17 M/MM3 (ref 4.1–5.3)
SODIUM SERPL-SCNC: 141 MMOL/L (ref 136–145)

## 2023-03-29 NOTE — NUR
Assessment complete. A/O x4. Sitting up at side of bed eating breakfast.
Reports chronic cough and SOA. Denies pain or needs at this time.

## 2023-03-29 NOTE — NUR
Discharge instructions reviewed with pt- she verbalizes understanding. INT to
RH d/c'd with cath tip intact. Tele d/c'd. Pt escorted to private vehicle via
w/c and discharged home with .